# Patient Record
Sex: FEMALE | Race: WHITE | Employment: FULL TIME | URBAN - METROPOLITAN AREA
[De-identification: names, ages, dates, MRNs, and addresses within clinical notes are randomized per-mention and may not be internally consistent; named-entity substitution may affect disease eponyms.]

---

## 2021-11-24 ENCOUNTER — OFFICE VISIT (OUTPATIENT)
Dept: PODIATRY CLINIC | Facility: CLINIC | Age: 66
End: 2021-11-24
Payer: MEDICARE

## 2021-11-24 DIAGNOSIS — L84 FOOT CALLUS: ICD-10-CM

## 2021-11-24 DIAGNOSIS — L85.3 XEROSIS CUTIS: ICD-10-CM

## 2021-11-24 DIAGNOSIS — M79.672 BILATERAL FOOT PAIN: ICD-10-CM

## 2021-11-24 DIAGNOSIS — M21.621 TAILOR'S BUNIONETTE, BILATERAL: Primary | ICD-10-CM

## 2021-11-24 DIAGNOSIS — M79.671 BILATERAL FOOT PAIN: ICD-10-CM

## 2021-11-24 DIAGNOSIS — M21.622 TAILOR'S BUNIONETTE, BILATERAL: Primary | ICD-10-CM

## 2021-11-24 PROCEDURE — 99203 OFFICE O/P NEW LOW 30 MIN: CPT | Performed by: PODIATRIST

## 2021-11-24 RX ORDER — SECUKINUMAB 150 MG/ML
300 INJECTION SUBCUTANEOUS
COMMUNITY

## 2021-11-24 RX ORDER — AMMONIUM LACTATE 12 G/100G
LOTION TOPICAL
Qty: 225 G | Refills: 5 | Status: SHIPPED | OUTPATIENT
Start: 2021-11-24

## 2021-11-24 NOTE — PROGRESS NOTES
[facility], [facility]  Progress Note    Jose Armando Grace is a 74year old female. Patient presents with:   Foot Pain: Pt here w/ c/o plantar foot pain, large callus at dorsal 5th toe, pain 6/10        HPI:     This is a pleasant female with psoriasis on co bilateral    Bilateral foot pain    Foot callus    Xerosis cutis        Plan:     Discussed the etiology of this condition as well as both conservative and surgical treatment options.   Discussed conservative measures to include wide shoes, extra depth shoe

## 2022-02-23 ENCOUNTER — OFFICE VISIT (OUTPATIENT)
Dept: PODIATRY CLINIC | Facility: CLINIC | Age: 67
End: 2022-02-23
Payer: MEDICARE

## 2022-02-23 DIAGNOSIS — M79.672 BILATERAL FOOT PAIN: ICD-10-CM

## 2022-02-23 DIAGNOSIS — M21.621 TAILOR'S BUNIONETTE, BILATERAL: Primary | ICD-10-CM

## 2022-02-23 DIAGNOSIS — M79.671 BILATERAL FOOT PAIN: ICD-10-CM

## 2022-02-23 DIAGNOSIS — M21.622 TAILOR'S BUNIONETTE, BILATERAL: Primary | ICD-10-CM

## 2022-02-23 DIAGNOSIS — L84 FOOT CALLUS: ICD-10-CM

## 2022-02-23 PROCEDURE — 11056 PARNG/CUTG B9 HYPRKR LES 2-4: CPT | Performed by: PODIATRIST

## 2022-06-20 ENCOUNTER — OFFICE VISIT (OUTPATIENT)
Dept: PODIATRY CLINIC | Facility: CLINIC | Age: 67
End: 2022-06-20
Payer: MEDICARE

## 2022-06-20 DIAGNOSIS — L84 FOOT CALLUS: ICD-10-CM

## 2022-06-20 DIAGNOSIS — M79.671 BILATERAL FOOT PAIN: ICD-10-CM

## 2022-06-20 DIAGNOSIS — M79.672 BILATERAL FOOT PAIN: ICD-10-CM

## 2022-06-20 DIAGNOSIS — M21.621 TAILOR'S BUNIONETTE, BILATERAL: Primary | ICD-10-CM

## 2022-06-20 DIAGNOSIS — M21.622 TAILOR'S BUNIONETTE, BILATERAL: Primary | ICD-10-CM

## 2022-12-23 ENCOUNTER — OFFICE VISIT (OUTPATIENT)
Dept: PODIATRY CLINIC | Facility: CLINIC | Age: 67
End: 2022-12-23
Payer: MEDICARE

## 2022-12-23 DIAGNOSIS — M21.621 TAILOR'S BUNIONETTE, BILATERAL: Primary | ICD-10-CM

## 2022-12-23 DIAGNOSIS — M79.671 BILATERAL FOOT PAIN: ICD-10-CM

## 2022-12-23 DIAGNOSIS — L84 FOOT CALLUS: ICD-10-CM

## 2022-12-23 DIAGNOSIS — M21.622 TAILOR'S BUNIONETTE, BILATERAL: Primary | ICD-10-CM

## 2022-12-23 DIAGNOSIS — M79.672 BILATERAL FOOT PAIN: ICD-10-CM

## 2022-12-23 PROCEDURE — 11057 PARNG/CUTG B9 HYPRKR LES >4: CPT | Performed by: PODIATRIST

## 2023-04-24 ENCOUNTER — OFFICE VISIT (OUTPATIENT)
Dept: PODIATRY CLINIC | Facility: CLINIC | Age: 68
End: 2023-04-24

## 2023-04-24 DIAGNOSIS — M79.672 BILATERAL FOOT PAIN: ICD-10-CM

## 2023-04-24 DIAGNOSIS — M79.671 BILATERAL FOOT PAIN: ICD-10-CM

## 2023-04-24 DIAGNOSIS — M21.622 TAILOR'S BUNIONETTE, BILATERAL: Primary | ICD-10-CM

## 2023-04-24 DIAGNOSIS — L84 FOOT CALLUS: ICD-10-CM

## 2023-04-24 DIAGNOSIS — M21.621 TAILOR'S BUNIONETTE, BILATERAL: Primary | ICD-10-CM

## 2023-04-24 PROCEDURE — 99213 OFFICE O/P EST LOW 20 MIN: CPT | Performed by: PODIATRIST
